# Patient Record
Sex: FEMALE | Race: BLACK OR AFRICAN AMERICAN | NOT HISPANIC OR LATINO | Employment: UNEMPLOYED | URBAN - METROPOLITAN AREA
[De-identification: names, ages, dates, MRNs, and addresses within clinical notes are randomized per-mention and may not be internally consistent; named-entity substitution may affect disease eponyms.]

---
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
## 2023-12-02 PROBLEM — Z13.9 ENCOUNTER FOR MEDICAL SCREENING EXAMINATION: Status: ACTIVE | Noted: 2023-12-02

## 2023-12-02 PROBLEM — Z34.90 PREGNANCY: Status: ACTIVE | Noted: 2023-12-02

## 2023-12-04 PROBLEM — E44.1 MILD MALNUTRITION: Status: ACTIVE | Noted: 2023-12-04

## 2023-12-08 PROBLEM — F33.2 SEVERE EPISODE OF RECURRENT MAJOR DEPRESSIVE DISORDER, WITHOUT PSYCHOTIC FEATURES: Status: ACTIVE | Noted: 2023-12-08

## 2024-03-04 PROBLEM — Z13.9 ENCOUNTER FOR MEDICAL SCREENING EXAMINATION: Status: RESOLVED | Noted: 2023-12-02 | Resolved: 2024-03-04

## 2024-08-26 PROBLEM — F41.9 ANXIETY: Status: ACTIVE | Noted: 2024-08-26

## 2024-08-26 PROBLEM — F31.9 BIPOLAR DISORDER: Status: ACTIVE | Noted: 2024-08-26

## 2024-08-26 PROBLEM — F90.9 ATTENTION DEFICIT HYPERACTIVITY DISORDER: Status: ACTIVE | Noted: 2024-08-26

## 2024-08-26 PROBLEM — F32.A DEPRESSION: Status: ACTIVE | Noted: 2022-02-02

## 2024-08-26 PROBLEM — A60.9 HSV (HERPES SIMPLEX VIRUS) ANOGENITAL INFECTION: Status: ACTIVE | Noted: 2024-08-26

## 2024-08-26 PROBLEM — Z34.90 PATIENT CURRENTLY PREGNANT: Status: ACTIVE | Noted: 2021-01-28

## 2024-08-26 PROBLEM — A60.00 GENITAL HERPES SIMPLEX: Status: ACTIVE | Noted: 2024-08-26

## 2025-02-04 ENCOUNTER — HOSPITAL ENCOUNTER (EMERGENCY)
Facility: HOSPITAL | Age: 25
Discharge: HOME | End: 2025-02-04
Attending: EMERGENCY MEDICINE
Payer: MEDICAID

## 2025-02-04 VITALS
TEMPERATURE: 98 F | SYSTOLIC BLOOD PRESSURE: 128 MMHG | WEIGHT: 151 LBS | HEART RATE: 77 BPM | RESPIRATION RATE: 17 BRPM | BODY MASS INDEX: 21.06 KG/M2 | DIASTOLIC BLOOD PRESSURE: 91 MMHG | OXYGEN SATURATION: 100 %

## 2025-02-04 DIAGNOSIS — R60.0 BILATERAL LOWER EXTREMITY EDEMA: Primary | ICD-10-CM

## 2025-02-04 DIAGNOSIS — R60.9 EDEMA: ICD-10-CM

## 2025-02-04 DIAGNOSIS — D64.9 ANEMIA, UNSPECIFIED TYPE: ICD-10-CM

## 2025-02-04 LAB
ALBUMIN SERPL BCP-MCNC: 3.9 G/DL (ref 3.5–5.2)
ALP SERPL-CCNC: 86 U/L (ref 40–150)
ALT SERPL W/O P-5'-P-CCNC: 19 U/L (ref 10–44)
ANION GAP SERPL CALC-SCNC: 9 MMOL/L (ref 8–16)
AST SERPL-CCNC: 20 U/L (ref 10–40)
B-HCG UR QL: NEGATIVE
BASOPHILS # BLD AUTO: 0.09 K/UL (ref 0–0.2)
BASOPHILS NFR BLD: 0.9 % (ref 0–1.9)
BILIRUB SERPL-MCNC: 0.6 MG/DL (ref 0.1–1)
BILIRUB UR QL STRIP: NEGATIVE
BNP SERPL-MCNC: <10 PG/ML (ref 0–99)
BUN SERPL-MCNC: 13 MG/DL (ref 6–20)
CALCIUM SERPL-MCNC: 10 MG/DL (ref 8.7–10.5)
CHLORIDE SERPL-SCNC: 104 MMOL/L (ref 95–110)
CLARITY UR: CLEAR
CO2 SERPL-SCNC: 25 MMOL/L (ref 23–29)
COLOR UR: YELLOW
CREAT SERPL-MCNC: 0.8 MG/DL (ref 0.5–1.4)
DIFFERENTIAL METHOD BLD: ABNORMAL
EOSINOPHIL # BLD AUTO: 0.1 K/UL (ref 0–0.5)
EOSINOPHIL NFR BLD: 1.3 % (ref 0–8)
ERYTHROCYTE [DISTWIDTH] IN BLOOD BY AUTOMATED COUNT: 17 % (ref 11.5–14.5)
EST. GFR  (NO RACE VARIABLE): >60 ML/MIN/1.73 M^2
GLUCOSE SERPL-MCNC: 94 MG/DL (ref 70–110)
GLUCOSE UR QL STRIP: NEGATIVE
HCT VFR BLD AUTO: 31.4 % (ref 37–48.5)
HGB BLD-MCNC: 9.8 G/DL (ref 12–16)
HGB UR QL STRIP: NEGATIVE
IMM GRANULOCYTES # BLD AUTO: 0.02 K/UL (ref 0–0.04)
IMM GRANULOCYTES NFR BLD AUTO: 0.2 % (ref 0–0.5)
KETONES UR QL STRIP: NEGATIVE
LEUKOCYTE ESTERASE UR QL STRIP: NEGATIVE
LYMPHOCYTES # BLD AUTO: 1.9 K/UL (ref 1–4.8)
LYMPHOCYTES NFR BLD: 19.2 % (ref 18–48)
MCH RBC QN AUTO: 27.5 PG (ref 27–31)
MCHC RBC AUTO-ENTMCNC: 31.2 G/DL (ref 32–36)
MCV RBC AUTO: 88 FL (ref 82–98)
MONOCYTES # BLD AUTO: 0.8 K/UL (ref 0.3–1)
MONOCYTES NFR BLD: 8.1 % (ref 4–15)
NEUTROPHILS # BLD AUTO: 6.9 K/UL (ref 1.8–7.7)
NEUTROPHILS NFR BLD: 70.3 % (ref 38–73)
NITRITE UR QL STRIP: NEGATIVE
NRBC BLD-RTO: 0 /100 WBC
PH UR STRIP: 7 [PH] (ref 5–8)
PLATELET # BLD AUTO: 470 K/UL (ref 150–450)
PMV BLD AUTO: 9.8 FL (ref 9.2–12.9)
POTASSIUM SERPL-SCNC: 4 MMOL/L (ref 3.5–5.1)
PROT SERPL-MCNC: 7.6 G/DL (ref 6–8.4)
PROT UR QL STRIP: NEGATIVE
RBC # BLD AUTO: 3.57 M/UL (ref 4–5.4)
SODIUM SERPL-SCNC: 138 MMOL/L (ref 136–145)
SP GR UR STRIP: 1.01 (ref 1–1.03)
TROPONIN I SERPL DL<=0.01 NG/ML-MCNC: <0.006 NG/ML (ref 0–0.03)
URN SPEC COLLECT METH UR: NORMAL
UROBILINOGEN UR STRIP-ACNC: NEGATIVE EU/DL
WBC # BLD AUTO: 9.86 K/UL (ref 3.9–12.7)

## 2025-02-04 PROCEDURE — 83880 ASSAY OF NATRIURETIC PEPTIDE: CPT | Performed by: PHYSICIAN ASSISTANT

## 2025-02-04 PROCEDURE — 85025 COMPLETE CBC W/AUTO DIFF WBC: CPT | Performed by: PHYSICIAN ASSISTANT

## 2025-02-04 PROCEDURE — 80053 COMPREHEN METABOLIC PANEL: CPT | Performed by: PHYSICIAN ASSISTANT

## 2025-02-04 PROCEDURE — 93010 ELECTROCARDIOGRAM REPORT: CPT | Mod: ,,, | Performed by: INTERNAL MEDICINE

## 2025-02-04 PROCEDURE — 93005 ELECTROCARDIOGRAM TRACING: CPT

## 2025-02-04 PROCEDURE — 81025 URINE PREGNANCY TEST: CPT | Performed by: PHYSICIAN ASSISTANT

## 2025-02-04 PROCEDURE — 81003 URINALYSIS AUTO W/O SCOPE: CPT | Performed by: PHYSICIAN ASSISTANT

## 2025-02-04 PROCEDURE — 84484 ASSAY OF TROPONIN QUANT: CPT | Performed by: PHYSICIAN ASSISTANT

## 2025-02-04 PROCEDURE — 99285 EMERGENCY DEPT VISIT HI MDM: CPT | Mod: 25

## 2025-02-04 NOTE — FIRST PROVIDER EVALUATION
Emergency Department TeleTriage Encounter Note      CHIEF COMPLAINT    Chief Complaint   Patient presents with    Leg Swelling     Pt c/o swelling to both legs and feet that began today.  Pt also c/o swelling to her pelvic area since having a baby in November.       VITAL SIGNS   Initial Vitals [02/04/25 0901]   BP Pulse Resp Temp SpO2   131/68 102 16 98 °F (36.7 °C) 98 %      MAP       --            ALLERGIES    Review of patient's allergies indicates:  No Known Allergies    PROVIDER TRIAGE NOTE  Patient presents with edema to bilateral lower extremities. No chest pain or SOB. She states extremity edema is new today, but she has been having edema to the pelvic area since giving birth in Nov.       ORDERS  Labs Reviewed - No data to display    ED Orders (720h ago, onward)      None              Virtual Visit Note: The provider triage portion of this emergency department evaluation and documentation was performed via [employer] a HIPAA-compliant telemedicine application, in concert with a tele-presenter in the room. A face to face patient evaluation with one of my colleagues will occur once the patient is placed in an emergency department room.      DISCLAIMER: This note was prepared with [employer] voice recognition transcription software. Garbled syntax, mangled pronouns, and other bizarre constructions may be attributed to that software system.

## 2025-02-04 NOTE — ED PROVIDER NOTES
SCRIBE #1 NOTE: I, Misael Munoz, am scribing for, and in the presence of, Ara Cadet MD. I have scribed the entire note.       History     Chief Complaint   Patient presents with    Leg Swelling     Pt c/o swelling to both legs and feet that began today.  Pt also c/o swelling to her pelvic area since having a baby in November.     Review of patient's allergies indicates:  No Known Allergies      History of Present Illness     HPI    2/4/2025, 10:27 AM  History obtained from the patient      History of Present Illness: Gaby Murray is a 24 y.o. female patient with a PMHx of anxiety, herpes simplex, and insomnia who presents to the Emergency Department for evaluation of BLE swelling which onset last PM after the pt got off of work. Pt reports that she was recently pregnant and delivered in November. Pt reports pelvic swelling since then which has persisted. Yesterday, pt returned to her work after 3 months away. Pt reports she is often sitting at her work. Symptoms are constant and moderate in severity. No mitigating or exacerbating factors reported. Patient denies any SOB, CP, vomiting, and all other sxs at this time. No prior Tx specified.  No further complaints or concerns at this time.       Arrival mode: Personal Transportation    PCP: Herberth Taylor Jr., MD        Past Medical History:  Past Medical History:   Diagnosis Date    Anxiety and depression     Herpes simplex type 1 infection     Insomnia        Past Surgical History:  No past surgical history on file.      Family History:  No family history on file.    Social History:  Social History     Tobacco Use    Smoking status: Never    Smokeless tobacco: Never   Substance and Sexual Activity    Alcohol use: Yes    Drug use: Never    Sexual activity: Yes     Partners: Female, Male     Birth control/protection: None        Review of Systems     Review of Systems   Constitutional:  Negative for chills, diaphoresis and fever.   HENT:   Negative for congestion.    Respiratory:  Negative for cough and shortness of breath.    Cardiovascular:  Negative for chest pain.   Gastrointestinal:  Negative for abdominal pain, diarrhea, nausea and vomiting.   Genitourinary:  Negative for dysuria.        (+) pelvic swelling baseline since November   Musculoskeletal:  Positive for joint swelling (BLE). Negative for back pain.   Skin:  Negative for rash.   Neurological:  Negative for dizziness, weakness and headaches.   All other systems reviewed and are negative.       Physical Exam     Initial Vitals [02/04/25 0901]   BP Pulse Resp Temp SpO2   131/68 102 16 98 °F (36.7 °C) 98 %      MAP       --          Physical Exam  Nursing Notes and Vital Signs Reviewed.  Constitutional: Patient is in no acute distress. Well-developed and well-nourished.  Head: Atraumatic. Normocephalic.  Eyes: EOM intact. Conjunctivae are not pale. No scleral icterus.  ENT: Mucous membranes are moist.   Neck: Supple. Full ROM.   Cardiovascular: Regular rate. Regular rhythm. No murmurs, rubs, or gallops.   Pulmonary/Chest: No respiratory distress. Clear to auscultation bilaterally. No wheezing or rales.  Abdominal: Soft and non-distended.  There is no tenderness.  No rebound, guarding, or rigidity.   Musculoskeletal: Moves all extremities. No obvious deformities. Trace BLE edema.  Skin: Warm and dry.  Neurological:  Alert, awake, and appropriate.  Normal speech.  No acute focal neurological deficits are appreciated.  Psychiatric: Normal affect. Good eye contact. Appropriate in content.        ED Course   Procedures  ED Vital Signs:  Vitals:    02/04/25 0901   BP: 131/68   Pulse: 102   Resp: 16   Temp: 98 °F (36.7 °C)   TempSrc: Oral   SpO2: 98%   Weight: 68.5 kg (151 lb)       Abnormal Lab Results:  Labs Reviewed   B-TYPE NATRIURETIC PEPTIDE   CBC W/ AUTO DIFFERENTIAL   COMPREHENSIVE METABOLIC PANEL   URINALYSIS, REFLEX TO URINE CULTURE   PREGNANCY TEST, URINE RAPID   TROPONIN I        All  Lab Results:  Results for orders placed or performed during the hospital encounter of 02/04/25   Urinalysis, Reflex to Urine Culture Urine, Clean Catch    Collection Time: 02/04/25 10:46 AM    Specimen: Urine, Clean Catch   Result Value Ref Range    Specimen UA Urine, Clean Catch     Color, UA Yellow Yellow, Straw, Rupa    Appearance, UA Clear Clear    pH, UA 7.0 5.0 - 8.0    Specific Gravity, UA 1.015 1.005 - 1.030    Protein, UA Negative Negative    Glucose, UA Negative Negative    Ketones, UA Negative Negative    Bilirubin (UA) Negative Negative    Occult Blood UA Negative Negative    Nitrite, UA Negative Negative    Urobilinogen, UA Negative <2.0 EU/dL    Leukocytes, UA Negative Negative   Pregnancy, urine rapid    Collection Time: 02/04/25 10:46 AM   Result Value Ref Range    Preg Test, Ur Negative    Brain natriuretic peptide    Collection Time: 02/04/25 10:47 AM   Result Value Ref Range    BNP <10 0 - 99 pg/mL   CBC auto differential    Collection Time: 02/04/25 10:47 AM   Result Value Ref Range    WBC 9.86 3.90 - 12.70 K/uL    RBC 3.57 (L) 4.00 - 5.40 M/uL    Hemoglobin 9.8 (L) 12.0 - 16.0 g/dL    Hematocrit 31.4 (L) 37.0 - 48.5 %    MCV 88 82 - 98 fL    MCH 27.5 27.0 - 31.0 pg    MCHC 31.2 (L) 32.0 - 36.0 g/dL    RDW 17.0 (H) 11.5 - 14.5 %    Platelets 470 (H) 150 - 450 K/uL    MPV 9.8 9.2 - 12.9 fL    Immature Granulocytes 0.2 0.0 - 0.5 %    Gran # (ANC) 6.9 1.8 - 7.7 K/uL    Immature Grans (Abs) 0.02 0.00 - 0.04 K/uL    Lymph # 1.9 1.0 - 4.8 K/uL    Mono # 0.8 0.3 - 1.0 K/uL    Eos # 0.1 0.0 - 0.5 K/uL    Baso # 0.09 0.00 - 0.20 K/uL    nRBC 0 0 /100 WBC    Gran % 70.3 38.0 - 73.0 %    Lymph % 19.2 18.0 - 48.0 %    Mono % 8.1 4.0 - 15.0 %    Eosinophil % 1.3 0.0 - 8.0 %    Basophil % 0.9 0.0 - 1.9 %    Differential Method Automated    Comprehensive metabolic panel    Collection Time: 02/04/25 10:47 AM   Result Value Ref Range    Sodium 138 136 - 145 mmol/L    Potassium 4.0 3.5 - 5.1 mmol/L    Chloride  104 95 - 110 mmol/L    CO2 25 23 - 29 mmol/L    Glucose 94 70 - 110 mg/dL    BUN 13 6 - 20 mg/dL    Creatinine 0.8 0.5 - 1.4 mg/dL    Calcium 10.0 8.7 - 10.5 mg/dL    Total Protein 7.6 6.0 - 8.4 g/dL    Albumin 3.9 3.5 - 5.2 g/dL    Total Bilirubin 0.6 0.1 - 1.0 mg/dL    Alkaline Phosphatase 86 40 - 150 U/L    AST 20 10 - 40 U/L    ALT 19 10 - 44 U/L    eGFR >60 >60 mL/min/1.73 m^2    Anion Gap 9 8 - 16 mmol/L   Troponin I    Collection Time: 02/04/25 10:47 AM   Result Value Ref Range    Troponin I <0.006 0.000 - 0.026 ng/mL         Imaging Results:  Imaging Results              X-Ray Chest AP Portable (In process)  Result time 02/04/25 09:57:41                     The EKG was ordered, reviewed, and independently interpreted by the ED provider.  Interpretation time: 10:15 AM  Rate: 75 BPM  Rhythm: normal sinus rhythm  Interpretation: No STEMI.           The Emergency Provider reviewed the vital signs and test results, which are outlined above.     ED Discussion     11:37 AM: Reassessed pt at this time.  Patient with anemia and she will follow up with her primary care doctor.  Otherwise she has not venous stasis.  Discussed with pt all pertinent ED information and results. Discussed pt dx and plan of tx. Gave pt all f/u and return to the ED instructions. All questions and concerns were addressed at this time. Pt expresses understanding of information and instructions, and is comfortable with plan to discharge. Pt is stable for discharge.    I discussed with patient and/or family/caretaker that evaluation in the ED does not suggest any emergent or life threatening medical conditions requiring immediate intervention beyond what was provided in the ED, and I believe patient is safe for discharge.  Regardless, an unremarkable evaluation in the ED does not preclude the development or presence of a serious of life threatening condition. As such, patient was instructed to return immediately for any worsening or change in  current symptoms.         Medical Decision Making  DDX: DVT, Abscess, Muscle strain, tear, or twisting injury to the leg, Calf muscle pull or tear, Lymphangitis or lymph obstruction, Venous insufficiency, Superficial thrombophlebitis, Popliteal (Baker's) cyst, Cellulitis, Knee abnormality, Idiopathic, Neoplasm                 ED Medication(s):  Medications - No data to display    New Prescriptions    No medications on file               Scribe Attestation:   Scribe #1: I performed the above scribed service and the documentation accurately describes the services I performed. I attest to the accuracy of the note.     Attending:   Physician Attestation Statement for Scribe #1: I, Ara Cadet MD, personally performed the services described in this documentation, as scribed by Misael Munoz, in my presence, and it is both accurate and complete.           Clinical Impression       ICD-10-CM ICD-9-CM   1. Edema  R60.9 782.3       Disposition:   Disposition: Discharged  Condition: Stable       Ara Cadet MD  02/04/25 1920

## 2025-02-04 NOTE — DISCHARGE INSTRUCTIONS
Follow up with your primary care doctor for the anemia and your OBGYN.  Wear compression stockings at work and elevate your feet at nighttime.    Return to emergency department if you develop:  - Sustained Fever >100.4 or low temperature <96.8   - Tachycardia (very high heart rate) or Pulse >90  - Hypotension (low blood pressure) a top number (systolic pressure) of <90 or a bottom number (diastolic pressure) of <40 or lower  - Hypertension (high blood pressure) a top number (systolic pressure) of >180 or a bottom number (diastolic pressure) of >120 or higher  - Severe pain not relieved with recommended pain control regimen  - Severe shortness of breath above baseline  - Severe nausea, vomiting, inability to tolerate oral feeding/hydration  - Altered mental status, abnormal interaction or behaviors   - If symptoms acutely worsen or do not improve  - Development of other worrisome symptom

## 2025-02-04 NOTE — Clinical Note
"Gaby Slaughtercecil Murray was seen and treated in our emergency department on 2/4/2025.  She may return to work on 02/04/2025.       If you have any questions or concerns, please don't hesitate to call.      Lizzy AUGUSTINE    "

## 2025-02-05 LAB
OHS QRS DURATION: 92 MS
OHS QTC CALCULATION: 408 MS

## 2025-02-17 PROBLEM — F33.2 SEVERE EPISODE OF RECURRENT MAJOR DEPRESSIVE DISORDER, WITHOUT PSYCHOTIC FEATURES: Status: RESOLVED | Noted: 2023-12-08 | Resolved: 2025-02-17

## 2025-02-17 PROBLEM — Z34.90 PATIENT CURRENTLY PREGNANT: Status: RESOLVED | Noted: 2021-01-28 | Resolved: 2025-02-17

## 2025-02-17 PROBLEM — A60.9 HSV (HERPES SIMPLEX VIRUS) ANOGENITAL INFECTION: Status: RESOLVED | Noted: 2024-08-26 | Resolved: 2025-02-17

## 2025-02-17 PROBLEM — E44.1 MILD MALNUTRITION: Status: RESOLVED | Noted: 2023-12-04 | Resolved: 2025-02-17

## 2025-02-18 ENCOUNTER — PATIENT MESSAGE (OUTPATIENT)
Dept: CARDIOLOGY | Facility: CLINIC | Age: 25
End: 2025-02-18
Payer: MEDICAID

## 2025-02-18 ENCOUNTER — TELEPHONE (OUTPATIENT)
Dept: PRIMARY CARE CLINIC | Facility: CLINIC | Age: 25
End: 2025-02-18
Payer: MEDICAID

## 2025-02-18 NOTE — TELEPHONE ENCOUNTER
Returned call to pt in regards to fist available appointment. No answer. Lvm     ----- Message from Nurse Fraga sent at 2/18/2025  9:29 AM CST -----  Good morning,The attached patient is needing to get established with a Primary Care Provider.Thank you!